# Patient Record
Sex: FEMALE | Race: BLACK OR AFRICAN AMERICAN | URBAN - METROPOLITAN AREA
[De-identification: names, ages, dates, MRNs, and addresses within clinical notes are randomized per-mention and may not be internally consistent; named-entity substitution may affect disease eponyms.]

---

## 2024-09-12 ENCOUNTER — OFFICE VISIT (OUTPATIENT)
Dept: URBAN - METROPOLITAN AREA CLINIC 25 | Facility: CLINIC | Age: 24
End: 2024-09-12

## 2024-09-12 RX ORDER — NORETHINDRONE 0.35 MG/1
TAKE 1 TABLET BY MOUTH ONCE DAILY TABLET ORAL
Qty: 84 EACH | Refills: 3 | Status: ACTIVE | COMMUNITY

## 2024-09-12 RX ORDER — LOSARTAN POTASSIUM 50 MG/1
TAKE 1 TABLET BY MOUTH ONCE DAILY TABLET, FILM COATED ORAL
Qty: 5 EACH | Refills: 0 | Status: ACTIVE | COMMUNITY

## 2024-09-26 ENCOUNTER — OFFICE VISIT (OUTPATIENT)
Dept: URBAN - METROPOLITAN AREA CLINIC 25 | Facility: CLINIC | Age: 24
End: 2024-09-26
Payer: COMMERCIAL

## 2024-09-26 ENCOUNTER — DASHBOARD ENCOUNTERS (OUTPATIENT)
Age: 24
End: 2024-09-26

## 2024-09-26 VITALS
TEMPERATURE: 97.7 F | BODY MASS INDEX: 48.82 KG/M2 | SYSTOLIC BLOOD PRESSURE: 119 MMHG | DIASTOLIC BLOOD PRESSURE: 87 MMHG | HEART RATE: 91 BPM | HEIGHT: 65 IN | OXYGEN SATURATION: 96 % | WEIGHT: 293 LBS

## 2024-09-26 DIAGNOSIS — K62.5 RECTAL BLEEDING: ICD-10-CM

## 2024-09-26 DIAGNOSIS — R14.0 BLOATING: ICD-10-CM

## 2024-09-26 PROCEDURE — 99204 OFFICE O/P NEW MOD 45 MIN: CPT | Performed by: INTERNAL MEDICINE

## 2024-09-26 RX ORDER — LOSARTAN POTASSIUM 50 MG/1
TAKE 1 TABLET BY MOUTH ONCE DAILY TABLET, FILM COATED ORAL
Qty: 5 EACH | Refills: 0 | Status: ACTIVE | COMMUNITY

## 2024-09-26 RX ORDER — NORETHINDRONE 0.35 MG/1
TAKE 1 TABLET BY MOUTH ONCE DAILY TABLET ORAL
Qty: 84 EACH | Refills: 3 | Status: ACTIVE | COMMUNITY

## 2024-09-26 NOTE — HPI-TODAY'S VISIT:
24 yo pt presents for evaluation of painful rectal bleeding August 25th and lasted for 2 days and constipation. Sxs improved with stool softners. Admits bloating/gas.

## 2024-10-31 ENCOUNTER — OFFICE VISIT (OUTPATIENT)
Dept: URBAN - METROPOLITAN AREA MEDICAL CENTER 8 | Facility: MEDICAL CENTER | Age: 24
End: 2024-10-31